# Patient Record
Sex: FEMALE
[De-identification: names, ages, dates, MRNs, and addresses within clinical notes are randomized per-mention and may not be internally consistent; named-entity substitution may affect disease eponyms.]

---

## 2023-11-03 ENCOUNTER — APPOINTMENT (OUTPATIENT)
Dept: GERIATRICS | Facility: CLINIC | Age: 83
End: 2023-11-03

## 2024-07-24 ENCOUNTER — NON-APPOINTMENT (OUTPATIENT)
Age: 84
End: 2024-07-24

## 2024-07-25 ENCOUNTER — APPOINTMENT (OUTPATIENT)
Dept: ORTHOPEDIC SURGERY | Facility: CLINIC | Age: 84
End: 2024-07-25
Payer: MEDICARE

## 2024-07-25 ENCOUNTER — APPOINTMENT (OUTPATIENT)
Dept: ORTHOPEDIC SURGERY | Facility: CLINIC | Age: 84
End: 2024-07-25

## 2024-07-25 DIAGNOSIS — S52.572A OTHER INTRAARTICULAR FRACTURE OF LOWER END OF LEFT RADIUS, INITIAL ENCOUNTER FOR CLOSED FRACTURE: ICD-10-CM

## 2024-07-25 PROBLEM — Z00.00 ENCOUNTER FOR PREVENTIVE HEALTH EXAMINATION: Status: ACTIVE | Noted: 2024-07-25

## 2024-07-25 PROCEDURE — 25600 CLTX DST RDL FX/EPHYS SEP WO: CPT | Mod: LT

## 2024-07-25 PROCEDURE — 99204 OFFICE O/P NEW MOD 45 MIN: CPT | Mod: 57

## 2024-07-25 RX ORDER — MEMANTINE HYDROCHLORIDE 5 MG-10 MG
KIT ORAL
Refills: 0 | Status: ACTIVE | COMMUNITY

## 2024-07-25 RX ORDER — SERTRALINE HYDROCHLORIDE 25 MG/1
TABLET, FILM COATED ORAL
Refills: 0 | Status: ACTIVE | COMMUNITY

## 2024-07-29 NOTE — IMAGING
[de-identified] : L wrist: skin intact TTP about the DR with deformity guarding with finger rom due to pain NVID  review of outside xrays- urgent care yesterday- 3 views of the wrist - distal radius fracture

## 2024-07-29 NOTE — ASSESSMENT
[FreeTextEntry1] : The patient was advised of the diagnosis. The natural history of the pathology was explained in full to the patient in layman's terms. All questions were answered. The risks and benefits of surgical and non-surgical treatment alternatives were explained in full to the patient. discused options- closed versus open treatment of distal radius fracture - amenable to closed tx- understands risk of cosmetic deformity  closed tx of DR fracture  A short arm cast was applied.  The importance of ice and elevation were discussed with the patient.  The risks were also discussed such as compartment syndrome and skin breakdown.  They were instructed to never put foreign objects down the cast.  Patients should call for increasing pain, worsening swelling, numbness, extremity discoloration, or any other concerns.

## 2024-07-29 NOTE — HISTORY OF PRESENT ILLNESS
[de-identified] : 7/25/24: left wrist pain after a fall last night. No N/T  RHD .urgent care notes reviewed 7/24/24

## 2024-07-29 NOTE — IMAGING
[de-identified] : L wrist: skin intact TTP about the DR with deformity guarding with finger rom due to pain NVID  review of outside xrays- urgent care yesterday- 3 views of the wrist - distal radius fracture

## 2024-07-29 NOTE — HISTORY OF PRESENT ILLNESS
[de-identified] : 7/25/24: left wrist pain after a fall last night. No N/T  RHD .urgent care notes reviewed 7/24/24

## 2024-09-19 ENCOUNTER — APPOINTMENT (OUTPATIENT)
Dept: ORTHOPEDIC SURGERY | Facility: CLINIC | Age: 84
End: 2024-09-19
Payer: MEDICARE

## 2024-09-19 DIAGNOSIS — S52.572A OTHER INTRAARTICULAR FRACTURE OF LOWER END OF LEFT RADIUS, INITIAL ENCOUNTER FOR CLOSED FRACTURE: ICD-10-CM

## 2024-09-19 PROCEDURE — 99024 POSTOP FOLLOW-UP VISIT: CPT

## 2024-09-19 PROCEDURE — 73110 X-RAY EXAM OF WRIST: CPT | Mod: LT

## 2024-09-19 NOTE — IMAGING
[de-identified] : L wrist: skin intact no TTP about the DR with deformity no guarding with finger rom due to pain NVID  xray 3 views taken of the left wrist today in office show: healed distal radius fractures

## 2024-09-19 NOTE — HISTORY OF PRESENT ILLNESS
[de-identified] : 9/19/24: Pt is here to f/u for left wrist injury>  7/25/24: left wrist pain after a fall last night. No N/T  RHD .urgent care notes reviewed 7/24/24

## 2024-09-19 NOTE — ASSESSMENT
[FreeTextEntry1] : The patient was advised of the diagnosis. The natural history of the pathology was explained in full to the patient in layman's terms. All questions were answered. The risks and benefits of surgical and non-surgical treatment alternatives were explained in full to the patient. recommended therapy- too difficult- would like to perform hep- discussed

## 2025-07-27 ENCOUNTER — EMERGENCY (EMERGENCY)
Facility: HOSPITAL | Age: 85
LOS: 0 days | Discharge: HOME | End: 2025-07-27
Attending: EMERGENCY MEDICINE
Payer: MEDICARE

## 2025-07-27 VITALS
SYSTOLIC BLOOD PRESSURE: 121 MMHG | TEMPERATURE: 98 F | HEART RATE: 64 BPM | DIASTOLIC BLOOD PRESSURE: 93 MMHG | RESPIRATION RATE: 18 BRPM | OXYGEN SATURATION: 97 %

## 2025-07-27 VITALS
HEART RATE: 68 BPM | TEMPERATURE: 98 F | OXYGEN SATURATION: 98 % | DIASTOLIC BLOOD PRESSURE: 77 MMHG | WEIGHT: 115.08 LBS | SYSTOLIC BLOOD PRESSURE: 129 MMHG | RESPIRATION RATE: 18 BRPM

## 2025-07-27 DIAGNOSIS — S00.03XA CONTUSION OF SCALP, INITIAL ENCOUNTER: ICD-10-CM

## 2025-07-27 DIAGNOSIS — Z96.649 PRESENCE OF UNSPECIFIED ARTIFICIAL HIP JOINT: ICD-10-CM

## 2025-07-27 DIAGNOSIS — F03.90 UNSPECIFIED DEMENTIA, UNSPECIFIED SEVERITY, WITHOUT BEHAVIORAL DISTURBANCE, PSYCHOTIC DISTURBANCE, MOOD DISTURBANCE, AND ANXIETY: ICD-10-CM
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
## 2025-07-27 DIAGNOSIS — S61.215A LACERATION WITHOUT FOREIGN BODY OF LEFT RING FINGER WITHOUT DAMAGE TO NAIL, INITIAL ENCOUNTER: ICD-10-CM

## 2025-07-27 PROCEDURE — 73130 X-RAY EXAM OF HAND: CPT | Mod: LT

## 2025-07-27 PROCEDURE — 99284 EMERGENCY DEPT VISIT MOD MDM: CPT

## 2025-07-27 PROCEDURE — 82962 GLUCOSE BLOOD TEST: CPT

## 2025-07-27 PROCEDURE — 99284 EMERGENCY DEPT VISIT MOD MDM: CPT | Mod: 25

## 2025-07-27 PROCEDURE — 70450 CT HEAD/BRAIN W/O DYE: CPT

## 2025-07-27 PROCEDURE — 70450 CT HEAD/BRAIN W/O DYE: CPT | Mod: 26

## 2025-07-27 PROCEDURE — 72125 CT NECK SPINE W/O DYE: CPT

## 2025-07-27 PROCEDURE — 72125 CT NECK SPINE W/O DYE: CPT | Mod: 26

## 2025-07-27 PROCEDURE — 73130 X-RAY EXAM OF HAND: CPT | Mod: 26,LT

## 2025-07-27 NOTE — ED ADULT NURSE NOTE - OBJECTIVE STATEMENT
Pt presents to ED at [facility] baseline as per family at bedside s/p unwitnessed fall yesterday at approx. 2 pm. Hematoma noted to back of head and laceration/bruising to left ring finger, No acute distress noted. Pt within sight of RN station.

## 2025-07-27 NOTE — ED PROVIDER NOTE - PATIENT PORTAL LINK FT
You can access the FollowMyHealth Patient Portal offered by [facility] by registering at the following website: [email or web address]. By joining [employer]’s FollowMyHealth portal, you will also be able to view your health information using other applications (apps) compatible with our system.

## 2025-07-27 NOTE — ED ADULT NURSE NOTE - NSFALLRISKINTERV_ED_ALL_ED
Assistance OOB with selected safe patient handling equipment if applicable/Assistance with ambulation/Communicate fall risk and risk factors to all staff, patient, and family/Monitor gait and stability/Monitor for mental status changes and reorient to person, place, and time, as needed/Provide patient with walking aids/Provide visual cue: yellow wristband, yellow gown, etc/Reinforce activity limits and safety measures with patient and family/Toileting schedule using arm’s reach rule for commode and bathroom/Use of alarms - bed, stretcher, chair and/or video monitoring/Call bell, personal items and telephone in reach/Instruct patient to call for assistance before getting out of bed/chair/stretcher/Non-slip footwear applied when patient is off stretcher/Emily to call system/Physically safe environment - no spills, clutter or unnecessary equipment/Purposeful Proactive Rounding/Room/bathroom lighting operational, light cord in reach

## 2025-07-27 NOTE — ED PROVIDER NOTE - NSFOLLOWUPINSTRUCTIONS_ED_ALL_ED_FT
You were seen in the emergency department for injuries from a potential fall.  You had a CT scan that did not demonstrate any acute traumatic findings.  It was incidentally noted that you have a multinodular thyroid which requires a nonemergent outpatient ultrasound.  Please follow-up with your primary care doctor to have this ordered.    Rest, drink plenty of fluids.  Advance activity as tolerated.  Continue all previously prescribed medications as directed.  Follow up with your primary care physician in 48-72 hours- bring copies of your results.  Return to the ER for worsening or persistent symptoms, and/or ANY NEW OR CONCERNING SYMPTOMS. If you have issues obtaining follow up, please call: [phone] to obtain a doctor or specialist who takes your insurance in your area.

## 2025-07-27 NOTE — ED PROVIDER NOTE - PROGRESS NOTE DETAILS
Maximus, DO: Patient signed out to me by Dr. Briscoe. Patient is here with unwitnessed fall last night. Plan of care is to follow-up CT and x-ray. Disposition is pending imaging.  CT reviewed, no acute traumatic findings but there is incidental multinodular thyroid.  Discussed results with  and daughter at bedside, advised to get a nonemergent outpatient ultrasound.  Patient nonambulatory with dementia at baseline, will require ambulance.  Unit secretary to order.  Patient stable for discharge.

## 2025-07-27 NOTE — ED PROVIDER NOTE - CLINICAL SUMMARY MEDICAL DECISION MAKING FREE TEXT BOX
83 y/o pt s/p fall out of bed. Plan ct head/neck [FreeTextEntry1] : Overweight for diet/exercise\par Asthma meets goals. Denies significant nocturnal symptoms. Rare beta agonist use. Denies significant cough, wheezing, SOB.\par Osteoarthritis\par Renal Stones seeing  also for prostate Lummerman\par HTN with mild elevation in blood pressure.\par Hyperlipidemia\par Status post knee replacement \par Bilat leg swelling left greater than right.  Likely secondary to prior surgery.

## 2025-07-27 NOTE — ED ADULT NURSE NOTE - CHIEF COMPLAINT QUOTE
Patient presents with EMS from [facility] assisted living / rehab reporting she fell yesterday around 2:30p unwitnessed.  reported she has a lump on her head last night but waited until today to call EMS. Patient on Lovenox. Patient in treatment for left hip fracture due to fall. Patient has dementia at baseline; unable to answer if she has pain or localize where

## 2025-07-27 NOTE — ED PROVIDER NOTE - OBJECTIVE STATEMENT
83 y/o pt here with . Pt with dementia, not good historian. Pt s/p hip replacement due to fall presenting to ED with probable fall out of bed yesterday. Yesterday,  noticed bump on pt's head, bump not better, brought in for further eval. Wife at normal baseline with no increasing pain

## 2025-07-27 NOTE — ED PROVIDER NOTE - PHYSICAL EXAMINATION
Constitutional: NAD AAOx3. Opens eyes. Does not answer questions.   Eyes: EOMI, pupils equal  Head: Normocephalic atraumatic. Nontender palpation scalp  Mouth: no airway obstruction  Cardiac: regular rate   Resp: Lungs CTAB  GI: Abd s/nt/nd  Neuro: CN2-12 intact  Skin: No rashes   Msk: nontender palpation extremities Constitutional: NAD AAOx3. Opens eyes. Does not answer questions.   Eyes: EOMI, pupils equal  Head: Normocephalic atraumatic. Nontender palpation scalp  Mouth: no airway obstruction  Cardiac: regular rate   Resp: Lungs CTAB  GI: Abd s/nt/nd  Neuro: CN2-12 intact  Skin: No rashes   Msk: nontender palpation extremities, left 4th digit with contusion and 1 cm lac

## 2025-07-27 NOTE — ED ADULT TRIAGE NOTE - CHIEF COMPLAINT QUOTE
Patient presents with EMS from [facility] [place] assisted living / rehab reporting she fell yesterday around 2:30p unwitnessed.  reported she has a lump on her head last night but waited until today to call EMS. Patient on Lovenox. Patient in treatment for left hip fracture due to fall. Patient has dementia at baseline; unable to answer if she has pain or localize where

## 2025-08-07 RX ORDER — SERTRALINE 100 MG/1
1 TABLET, FILM COATED ORAL
Qty: 30 | Refills: 0
Start: 2025-08-07 | End: 2025-09-05